# Patient Record
Sex: FEMALE | Race: WHITE | URBAN - METROPOLITAN AREA
[De-identification: names, ages, dates, MRNs, and addresses within clinical notes are randomized per-mention and may not be internally consistent; named-entity substitution may affect disease eponyms.]

---

## 2023-03-29 ENCOUNTER — OFFICE VISIT (OUTPATIENT)
Facility: LOCATION | Age: 33
End: 2023-03-29
Payer: COMMERCIAL

## 2023-03-29 DIAGNOSIS — T15.12XA FB IN CONJUNCTIVAL SAC OF LEFT EYE, INITIAL ENCOUNTER: Primary | ICD-10-CM

## 2023-03-29 PROCEDURE — 92012 INTRM OPH EXAM EST PATIENT: CPT | Performed by: OPTOMETRIST

## 2023-03-29 ASSESSMENT — KERATOMETRY
OS: 40.94
OD: 41.31

## 2023-03-29 NOTE — IMPRESSION/PLAN
Impression: FB in conjunctival sac of left eye, initial encounter: T15.12xA. 
aviva plugs present in excellent position, flush to eyelid but causing irritation Plan: d/c'd bll aviva plugs in office
pt to see primary eye doctor for annual eye health and vision exams
pt to rtc prn decrease va, increase pain, redness, f/f.